# Patient Record
Sex: MALE
[De-identification: names, ages, dates, MRNs, and addresses within clinical notes are randomized per-mention and may not be internally consistent; named-entity substitution may affect disease eponyms.]

---

## 2019-01-21 ENCOUNTER — HOSPITAL ENCOUNTER (OUTPATIENT)
Dept: HOSPITAL 46 - DS | Age: 69
Discharge: HOME | End: 2019-01-21
Attending: SPECIALIST
Payer: COMMERCIAL

## 2019-01-21 DIAGNOSIS — Z79.1: ICD-10-CM

## 2019-01-21 DIAGNOSIS — M75.101: Primary | ICD-10-CM

## 2019-01-21 DIAGNOSIS — Z79.899: ICD-10-CM

## 2019-01-21 PROCEDURE — 0LQ14ZZ REPAIR RIGHT SHOULDER TENDON, PERCUTANEOUS ENDOSCOPIC APPROACH: ICD-10-PCS | Performed by: SPECIALIST

## 2019-01-21 PROCEDURE — C1713 ANCHOR/SCREW BN/BN,TIS/BN: HCPCS

## 2019-01-21 NOTE — NUR
PT HAS MET DC CRITERIA. HIS IV IS DC'D AND HE IS GIVEN DC INSTRUCTIONS, WHICH
ARE TRANSLATED BY HIS DAUGHTER. THEY BOTH VERBALIZE UNDERSTANDING. PT IS TAKEN
OUT TO THE VEHICLE VIA WC BY RONALD SHELDON RN.

## 2019-01-21 NOTE — NUR
01/21/19 0901 Riana Sorenson 0848 PT ARRIVED IN PACU SLEEPY. 0900 OXYGEN REMOVED. SATS 98% ON
RA. NO C/O'S PAIN.

## 2019-01-21 NOTE — NUR
LE 0940: PT REQUESTS TO USE THE RESTROOM. HE IS HELPED UP OOB, PT REPORTS SOME
DIZZINESS, BUT IS ABLE TO AMBULATE HIMSELF WITHOUT ISSUE. VOID IS NOT
MEASURED, BUT IT SOUNDED LIKE THE VOID WAS QUANTITY SUFFICIENT. HE IS GIVEN
PUDDING AND CRACKERS.
 
DC CRITERIA IS DISCUSSED WITH PT'S DAUGHTER. NO ADDITIONAL NEEDS AT THIS TIME.

## 2019-01-21 NOTE — NUR
PT IS STILL PAIN FREE IN THE RIGHT SHOULDER. HE IS EDUCATED THAT IF HE WOULD
LIKE TO GO HOME HE CAN, HE HAS DC CRITERIA AT THIS TIME. PT WOULD LIKE TO STAY
A LITTLE LONGER BEFORE GOING HOME. NO ADDITIONAL NEEDS AT THIS TIME.

## 2019-01-21 NOTE — NUR
PT IS BACK TO DS FROM PACU. HIS FAMILY IS AT THE BEDSIDE. CALL LIGHT WITHIN
REACH. PT HAS NO C/O'S PAIN OR NAUSEA. HE IS RESTING AT THIS TIME. NO
ADDTIONAL NEEDS AT THIS TIME.
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
